# Patient Record
Sex: MALE | Race: WHITE | NOT HISPANIC OR LATINO | Employment: PART TIME | URBAN - METROPOLITAN AREA
[De-identification: names, ages, dates, MRNs, and addresses within clinical notes are randomized per-mention and may not be internally consistent; named-entity substitution may affect disease eponyms.]

---

## 2025-01-02 ENCOUNTER — APPOINTMENT (OUTPATIENT)
Dept: UROLOGY | Facility: CLINIC | Age: 75
End: 2025-01-02
Payer: MEDICARE

## 2025-01-02 VITALS — SYSTOLIC BLOOD PRESSURE: 156 MMHG | HEART RATE: 94 BPM | DIASTOLIC BLOOD PRESSURE: 104 MMHG | TEMPERATURE: 98.4 F

## 2025-01-02 DIAGNOSIS — R97.20 ELEVATED PSA: Primary | ICD-10-CM

## 2025-01-02 PROCEDURE — 1036F TOBACCO NON-USER: CPT | Performed by: UROLOGY

## 2025-01-02 PROCEDURE — 1159F MED LIST DOCD IN RCRD: CPT | Performed by: UROLOGY

## 2025-01-02 PROCEDURE — 99213 OFFICE O/P EST LOW 20 MIN: CPT | Performed by: UROLOGY

## 2025-01-02 PROCEDURE — 99203 OFFICE O/P NEW LOW 30 MIN: CPT | Performed by: UROLOGY

## 2025-01-02 PROCEDURE — 1160F RVW MEDS BY RX/DR IN RCRD: CPT | Performed by: UROLOGY

## 2025-01-02 PROCEDURE — G2211 COMPLEX E/M VISIT ADD ON: HCPCS | Performed by: UROLOGY

## 2025-01-02 ASSESSMENT — ENCOUNTER SYMPTOMS
OCCASIONAL FEELINGS OF UNSTEADINESS: 0
LOSS OF SENSATION IN FEET: 0
DEPRESSION: 0

## 2025-01-02 NOTE — PROGRESS NOTES
"History Of Present Illness  75-year-old male seeing me regarding elevated PSA    Fhx: Father may have had, unsure. Didn't die of prostate ca. Maternal aunts w/ BCa    Pt reports one mo ago, after intercourse he noted some blood with ejaculation. Happened twice, but has since subsided.     LUTS: some urgency, urge related dribbling,  Denies: Weak stream, hematuria, straining  NTF: 0-1x    PSA  - 09/2022 - 2.73  - 09/2023 - 3.51  - 09/2024 - 4.29  - 12/17/24 - 4.39    Past Medical History  He has no past medical history on file.    Surgical History  He has no past surgical history on file.     Social History  He has no history on file for tobacco use, alcohol use, and drug use.    Family History  No family history on file.     Allergies  Patient has no allergy information on record.    ROS: 12 system review was completed and is negative with the exception of those signs and symptoms noted in the history of present illness: A 12 system review was completed and is negative with the exception of those signs and symptoms noted in the history of present illness.     Exam:  General: in NAD, appears stated age  Head: normocephalic, atraumatic  Respiratory: normal effort, no use of accessory muscles  Cardiovascular: no edema noted  Skin: normal turgor, no rashes  Neurologic: grossly intact, oriented to person/place/time  Psychiatric: mode and affect appropriate     Last Recorded Vitals  There were no vitals taken for this visit.    No results found for: \"PSASCREEN\", \"PSAR\", \"KSCOR\", \"CREATININE\", \"HGB\"      ASSESSMENT/PLAN:  # Elevated PSA  -Suspect secondary to BPH related changes  -I recommended proceeding with an MRI of his prostate  -I discussed with him prostate biopsy, expected recovery.    Eugene Ball MD    "

## 2025-02-03 ENCOUNTER — APPOINTMENT (OUTPATIENT)
Dept: RADIOLOGY | Facility: HOSPITAL | Age: 75
End: 2025-02-03
Payer: MEDICARE

## 2025-02-05 ENCOUNTER — HOSPITAL ENCOUNTER (OUTPATIENT)
Dept: RADIOLOGY | Facility: HOSPITAL | Age: 75
Discharge: HOME | End: 2025-02-05
Payer: MEDICARE

## 2025-02-05 DIAGNOSIS — R97.20 ELEVATED PSA: ICD-10-CM

## 2025-02-05 PROCEDURE — 72197 MRI PELVIS W/O & W/DYE: CPT | Performed by: RADIOLOGY

## 2025-02-05 PROCEDURE — A9575 INJ GADOTERATE MEGLUMI 0.1ML: HCPCS | Performed by: UROLOGY

## 2025-02-05 PROCEDURE — 2550000001 HC RX 255 CONTRASTS: Performed by: UROLOGY

## 2025-02-05 PROCEDURE — 72197 MRI PELVIS W/O & W/DYE: CPT

## 2025-02-05 RX ORDER — GADOTERATE MEGLUMINE 376.9 MG/ML
0.2 INJECTION INTRAVENOUS
Status: COMPLETED | OUTPATIENT
Start: 2025-02-05 | End: 2025-02-05

## 2025-02-05 RX ADMIN — GADOTERATE MEGLUMINE 17 ML: 376.9 INJECTION INTRAVENOUS at 09:54

## 2025-02-06 ENCOUNTER — APPOINTMENT (OUTPATIENT)
Dept: RADIOLOGY | Facility: HOSPITAL | Age: 75
End: 2025-02-06
Payer: MEDICARE

## 2025-02-06 ENCOUNTER — APPOINTMENT (OUTPATIENT)
Dept: UROLOGY | Facility: CLINIC | Age: 75
End: 2025-02-06
Payer: MEDICARE

## 2025-02-07 ENCOUNTER — APPOINTMENT (OUTPATIENT)
Dept: UROLOGY | Facility: CLINIC | Age: 75
End: 2025-02-07
Payer: MEDICARE

## 2025-02-07 VITALS
DIASTOLIC BLOOD PRESSURE: 74 MMHG | HEART RATE: 85 BPM | WEIGHT: 197 LBS | HEIGHT: 71 IN | BODY MASS INDEX: 27.58 KG/M2 | SYSTOLIC BLOOD PRESSURE: 134 MMHG

## 2025-02-07 DIAGNOSIS — R97.20 ELEVATED PSA: Primary | ICD-10-CM

## 2025-02-07 DIAGNOSIS — R39.9 LOWER URINARY TRACT SYMPTOMS (LUTS): ICD-10-CM

## 2025-02-07 PROCEDURE — 99214 OFFICE O/P EST MOD 30 MIN: CPT | Performed by: STUDENT IN AN ORGANIZED HEALTH CARE EDUCATION/TRAINING PROGRAM

## 2025-02-07 PROCEDURE — 1159F MED LIST DOCD IN RCRD: CPT | Performed by: STUDENT IN AN ORGANIZED HEALTH CARE EDUCATION/TRAINING PROGRAM

## 2025-02-07 NOTE — PROGRESS NOTES
Subjective    Ethan Soliman is a 75 y.o. male with elevated PSA. Previously seen by Dr. Ball. His wife is accompanying him during his visit.     Patient reports one month ago, after intercourse he noted some blood with ejaculation. Happened twice, but has since subsided. He denies hematuria.   He saw Dr. Lyons.  His PSA > 4 x2.  He had a prostate MRI.  He is now seeing me due to [facility] as well as his follow-up scheduled with Dr. Ball wasn't for another month.    He has LUTs, some urgency and frequency. He has noticed when he drinks caffeine, carbonation, or ice tea he needs to urinate frequency.     He states he had not noticed a difference on tamsulosin.    He has ED. He was given Cialis by his GP and he has noticed improvement.      Maternal aunts w/ BCa     PSA trend:  12/17/24 - 4.39  09/2024 - 4.29  09/2023 - 3.51   09/2022 - 2.73    MRI prostate with a 130 gram gland.  No PIRAD lesions.  PSAD low.    PMHx:  has no past medical history on file.      PSHx:  has no past surgical history on file.      Social:   Tobacco Use: Medium Risk (1/2/2025)    Patient History     Smoking Tobacco Use: Former     Smokeless Tobacco Use: Never     Passive Exposure: Not on file         Family: Cancer-related family history is not on file.          Review of Systems    All systems were reviewed. Anything negative was noted in the HPI.    Objective   Physical Exam  Gen: No acute distress      Psych: Alert and oriented x3      Neuro:  Normal ROM     Resp: Nonlabored respirations      CV: Regular rate and rhythm      Abd: S, NT, ND.     : Deferred     Skin: Warm, dry and intact without rashes      Lymphatics: No peripheral edema           No past medical history on file.      No past surgical history on file.      Assessment & Plan  Elevated PSA  75 y.o. male with elevated PSA. Previously seen by Dr. Ball. His PSA is 4.39.     He reports blood in ejaculation, happened  twice it has subsided. I reassured him this is not a symptom of malignancy.     PLAN:  I recommended continuing follow up with Dr. Ball. I agree with proceeding with an MRI of his prostate.  His MRI was negative.  PSAD low.  I do not think he needs a biopsy. I suggest retrial Tamsulosin.  If does not help, follow up with Dr. Ball and based on QoL metrics may be reasonable to consider outlet procedures to improve LUTS.  Options would be based on prostate size.    Also discussed about ED and went over this.  He was given Cialis by his GP and he has noticed improvement. I recommended continuing Cilias for ED as it is working. We discussed how to take it and how stimulation is needed for it to work.            Lower urinary tract symptoms (LUTS)  Take tamsulosin for a month, if improvement in symptoms are seen continue taking it if not discontinue. I recommended limiting caffeine, carbonation, or ice tea he needs to urinate frequency.                                     Scribe Attestation  By signing my name below, I, Victoria Betancourt   attest that this documentation has been prepared under the direction and in the presence of Tj Sun MD MPH

## 2025-02-07 NOTE — ASSESSMENT & PLAN NOTE
75 y.o. male with elevated PSA. Previously seen by Dr. Ball. His PSA is 4.39.     He reports blood in ejaculation, happened twice it has subsided. I reassured him this is not a symptom of malignancy.     PLAN:  I recommended continuing follow up with Dr. Ball. I agree with proceeding with an MRI of his prostate.  His MRI was negative.  PSAD low.  I do not think he needs a biopsy. I suggest retrial Tamsulosin.  If does not help, follow up with Dr. Ball and based on QoL metrics may be reasonable to consider outlet procedures to improve LUTS.  Options would be based on prostate size.    Also discussed about ED and went over this.  He was given Cialis by his GP and he has noticed improvement. I recommended continuing Cilias for ED as it is working. We discussed how to take it and how stimulation is needed for it to work.

## 2025-02-07 NOTE — ASSESSMENT & PLAN NOTE
Take tamsulosin for a month, if improvement in symptoms are seen continue taking it if not discontinue. I recommended limiting caffeine, carbonation, or ice tea he needs to urinate frequency.

## 2025-03-06 ENCOUNTER — APPOINTMENT (OUTPATIENT)
Dept: UROLOGY | Facility: CLINIC | Age: 75
End: 2025-03-06
Payer: MEDICARE

## 2025-03-10 ENCOUNTER — APPOINTMENT (OUTPATIENT)
Dept: UROLOGY | Facility: CLINIC | Age: 75
End: 2025-03-10
Payer: MEDICARE

## 2025-03-10 VITALS
SYSTOLIC BLOOD PRESSURE: 146 MMHG | DIASTOLIC BLOOD PRESSURE: 75 MMHG | WEIGHT: 198.8 LBS | HEART RATE: 55 BPM | BODY MASS INDEX: 26.93 KG/M2 | HEIGHT: 72 IN

## 2025-03-10 DIAGNOSIS — R97.20 ELEVATED PSA: Primary | ICD-10-CM

## 2025-03-10 DIAGNOSIS — R39.9 LOWER URINARY TRACT SYMPTOMS (LUTS): ICD-10-CM

## 2025-03-10 PROCEDURE — 1036F TOBACCO NON-USER: CPT | Performed by: UROLOGY

## 2025-03-10 PROCEDURE — 1159F MED LIST DOCD IN RCRD: CPT | Performed by: UROLOGY

## 2025-03-10 PROCEDURE — 99214 OFFICE O/P EST MOD 30 MIN: CPT | Performed by: UROLOGY

## 2025-03-10 PROCEDURE — G2211 COMPLEX E/M VISIT ADD ON: HCPCS | Performed by: UROLOGY

## 2025-03-10 PROCEDURE — 1160F RVW MEDS BY RX/DR IN RCRD: CPT | Performed by: UROLOGY

## 2025-03-10 RX ORDER — TADALAFIL 5 MG/1
5 TABLET ORAL DAILY
Qty: 90 TABLET | Refills: 3 | Status: SHIPPED | OUTPATIENT
Start: 2025-03-10 | End: 2026-03-10

## 2025-03-10 NOTE — PROGRESS NOTES
PAST UROLOGICAL HISTORY:  Initial evaluation in September 2022 for elevated PSA of 2.73. Serial PSA values showed gradual increase to 4.39 by December 2024. Family history notable for possible prostate cancer in father (did not die from it) and maternal aunts with breast cancer. One month prior to last visit, experienced post-coital hematospermia twice which subsequently resolved. Lower urinary tract symptoms included urgency and urge-related dribbling, without weak stream, hematuria, or straining. Nocturia 0-1 times per night.    HPI TODAY 03/10/2025:  Elevated PSA:  - MRI prostate performed 02/05/2025 showed 35-gram prostate with no concerning areas for prostate cancer  - Reports persistent urinary urgency with occasional urge incontinence, particularly when seated  - Notes situational variability in symptoms, with fewer issues at sporting events  - Symptoms worsen with caffeine intake, particularly coffee and iced tea  - Currently managing symptoms with fluid modification, avoiding excessive caffeine  - Reports erectile function present but with difficulty maintaining erections    PSA History:  - 09/2022 - 2.73  - 09/2023 - 3.51  - 09/2024 - 4.29  - 12/17/24 - 4.39  - PSA Density: 0.13 ng/mL/cc (4.39/35)    PAST MEDICAL HISTORY:  - No current medications  - Previously tried tamsulosin without significant improvement in urgency symptoms    SOCIAL:  - Lives in [place] area  - Enjoys watching [employer] games  - Recent travel to [place] in January    REVIEW OF SYSTEMS:  A tailored review of systems was performed and all pertinent positives and negatives are listed in the HPI.    PHYSICAL EXAMINATION:  Gen: NAD  Pulm: No increased WOB on RA  Cards: WWP    ASSESSMENT/PLAN:    Elevated PSA with Lower Urinary Tract Symptoms (N40.0)  - Assessment: PSA elevation likely secondary to BPH. Recent MRI shows no concerning areas for prostate cancer.  - Plan: Will initiate tadalafil 5mg daily. Follow-up in 4 months to assess  response. If symptoms well controlled, will transition to annual follow-up.  - Counseling: Discussed medication options including tadalafil, noting potential benefits for both urinary symptoms and erectile function. Reviewed common side effects including heartburn and rare side effects such as myalgias. Provided GoodRx coupon for cost savings.    The patient provided verbal consent for the audio recording of today's encounter using AI.

## 2025-07-28 ENCOUNTER — APPOINTMENT (OUTPATIENT)
Dept: UROLOGY | Facility: CLINIC | Age: 75
End: 2025-07-28
Payer: MEDICARE

## 2025-07-28 VITALS — SYSTOLIC BLOOD PRESSURE: 137 MMHG | HEART RATE: 54 BPM | TEMPERATURE: 97 F | DIASTOLIC BLOOD PRESSURE: 80 MMHG

## 2025-07-28 DIAGNOSIS — R35.0 URINARY FREQUENCY: ICD-10-CM

## 2025-07-28 DIAGNOSIS — R39.9 LOWER URINARY TRACT SYMPTOMS (LUTS): ICD-10-CM

## 2025-07-28 PROCEDURE — 1159F MED LIST DOCD IN RCRD: CPT | Performed by: UROLOGY

## 2025-07-28 PROCEDURE — G2211 COMPLEX E/M VISIT ADD ON: HCPCS | Performed by: UROLOGY

## 2025-07-28 PROCEDURE — 99214 OFFICE O/P EST MOD 30 MIN: CPT | Performed by: UROLOGY

## 2025-07-28 PROCEDURE — 1160F RVW MEDS BY RX/DR IN RCRD: CPT | Performed by: UROLOGY

## 2025-07-28 RX ORDER — TAMSULOSIN HYDROCHLORIDE 0.4 MG/1
0.4 CAPSULE ORAL AS NEEDED
COMMUNITY

## 2025-07-28 NOTE — PROGRESS NOTES
PAST UROLOGICAL HISTORY:  75-year-old man initially evaluated 09/2022 for elevated PSA 2.73. Serial PSA values showed gradual increase: 09/2023 - 3.51, 09/2024 - 4.29, 12/17/2024 - 4.39. Family history notable for possible prostate cancer in father (did not die from it) and maternal aunts with breast cancer. One month prior to last visit, experienced post-coital hematospermia twice which subsequently resolved. MRI prostate performed 02/05/2025 showed 35-gram prostate with no concerning areas for prostate cancer. Previously tried tamsulosin without significant improvement in urgency symptoms.    Notes, images, and tests were independently reviewed and interpreted as noted herein.    HPI TODAY 07/28/2025:    Elevated PSA:  - PSA stable at 4.39 as of 12/17/2024  - MRI 02/05/2025 reassuring, showing 35-gram prostate with no concerning areas  - No new urological concerns regarding PSA elevation    LUTS:  - Discontinued tadalafil 5mg daily due to perceived worsening of urgency symptoms  - Reports improvement in nocturia after stopping tadalafil  - Previously sleeping through night, now waking once nightly  - Experiencing urgency and urge incontinence, particularly evenings  - Had few accidents, mostly late evening  - Attributes symptoms partly to increased caffeine intake from homemade iced tea  - Stream weakness noted intermittently    PSA History:  - 09/2022 - 2.73  - 09/2023 - 3.51  - 09/2024 - 4.29  - 12/17/2024 - 4.39  - PSA Density: 0.13 ng/mL/cc (4.39/35)    PAST MEDICAL HISTORY:  - No current medications  - Family history: possible prostate cancer in father, maternal aunts with breast cancer    SOCIAL:  Lives in [place], building [employer] in [place]. Currently selling current home with anticipated move end of September. Enjoys watching [employer]. Recent travel to [place], planning return 01/01/2026.    REVIEW OF SYSTEMS: A tailored review of systems was performed and all pertinent positives  and negatives are listed in the HPI.    PHYSICAL EXAMINATION:  Gen: NAD  Pulm: No increased WOB on RA  Cards: WWP    ASSESSMENT/PLAN:    BPH with LUTS (N40.1)  - Assessment: 135-gram prostate causing significant LUTS symptoms  - Plan:     - Trial tamsulosin (has supply at home)    - Limit caffeine intake, especially caffeinated tea    - Limit fluid intake 2 hours before bedtime    - F/U 8 months    - If medical management fails, surgical options include robotic simple prostatectomy or laser enucleation  - Counseling: Discussed lifestyle modifications for symptom management. Surgical options available if medical therapy inadequate.    Elevated PSA (R97.20)  - Assessment: Stable PSA with reassuring MRI findings  - Plan: Continue surveillance, F/U 8 months

## 2025-07-28 NOTE — PATIENT INSTRUCTIONS
Robotic Simple Prostatectomy: Information for Mr. Soliman    This surgery is designed to treat an enlarged prostate by removing the part of the prostate gland that is blocking the flow of urine. It is performed using a robotic system, which allows the surgeon to make small incisions in your belly to access and treat the prostate.    What to Expect:  *  Procedure: Small cuts are made on your abdomen, and the surgeon uses robotic instruments to carefully remove the inner part of your prostate, relieving the blockage.  *  Hospital Stay: You will typically stay in the hospital overnight for observation.  *  Catheter: A temporary tube called a catheter will be placed in your bladder to help you urinate while you heal. This is usually removed after about 7 days.    Potential Risks:  As with any surgery, there are some risks, though they are not common. These can include:  *  A urinary tract infection (UTI).  *  Blood in the urine for a short period after the surgery.    This procedure is a very effective and durable option for improving urinary symptoms caused by a large prostate. We can discuss this in more detail if your symptoms do not improve with medication.

## 2026-03-30 ENCOUNTER — APPOINTMENT (OUTPATIENT)
Dept: UROLOGY | Facility: CLINIC | Age: 76
End: 2026-03-30
Payer: MEDICARE